# Patient Record
Sex: FEMALE | Race: BLACK OR AFRICAN AMERICAN | NOT HISPANIC OR LATINO | Employment: UNEMPLOYED | URBAN - METROPOLITAN AREA
[De-identification: names, ages, dates, MRNs, and addresses within clinical notes are randomized per-mention and may not be internally consistent; named-entity substitution may affect disease eponyms.]

---

## 2020-11-25 NOTE — PATIENT DISCUSSION
(370.33) KERATOCONJUNCTIVITIS SICCA NOT SPEC AS SJOGRENS - Assesment : Examination revealed Dry Eye Syndrome - Plan : Monitor for changes. Advised patient to call our office with decreased vision or increased symptoms. Use artificial tears as needed for relief. GLRx given today. RTC 1 year for Exam/OCT ONH, sooner if problems or changes.

## 2020-11-25 NOTE — PATIENT DISCUSSION
(365.01) OPEN ANGLE GLAUCOMA W/BORDERLINE FINDINGS-LOW RISK - Assesment : Examination revealed suspicion for Open Angle Glaucoma. OCT ONH performed today. - Plan : Monitor for changes. Advised patient to call our office when decreased vision or increased eye pain. RTC 1 year for Exam/OCT ONH, sooner if problems or changes occur.

## 2020-11-25 NOTE — PATIENT DISCUSSION
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 - Plan : Monitor for changes. Advised patient to call our office with decreased vision or increased symptoms.

## 2021-02-08 ENCOUNTER — PREPPED CHART (OUTPATIENT)
Dept: URBAN - METROPOLITAN AREA CLINIC 19 | Facility: CLINIC | Age: 61
End: 2021-02-08

## 2022-02-07 ENCOUNTER — COMPREHENSIVE EXAM (OUTPATIENT)
Dept: URBAN - METROPOLITAN AREA CLINIC 19 | Facility: CLINIC | Age: 62
End: 2022-02-07

## 2022-02-07 DIAGNOSIS — H52.223: ICD-10-CM

## 2022-02-07 PROCEDURE — 99211NC NO CHARGE VISIT

## 2022-02-07 ASSESSMENT — KERATOMETRY
OS_AXISANGLE_DEGREES: 87
OS_K1POWER_DIOPTERS: 43.25
OS_AXISANGLE2_DEGREES: 177
OD_AXISANGLE2_DEGREES: 177
OD_K2POWER_DIOPTERS: 44.00
OD_AXISANGLE_DEGREES: 87
OD_K1POWER_DIOPTERS: 43.25
OS_K2POWER_DIOPTERS: 44.00

## 2022-02-07 ASSESSMENT — VISUAL ACUITY
OD_PH: 20/30-2
OD_CC: 20/40
OU_CC: 20/25
OS_CC: 20/30-2

## 2022-02-07 ASSESSMENT — TONOMETRY
OS_IOP_MMHG: 20
OD_IOP_MMHG: 22

## 2024-03-13 ENCOUNTER — ESTABLISHED PATIENT (OUTPATIENT)
Dept: URBAN - METROPOLITAN AREA CLINIC 19 | Facility: CLINIC | Age: 64
End: 2024-03-13

## 2024-03-13 DIAGNOSIS — H52.223: ICD-10-CM

## 2024-03-13 DIAGNOSIS — H25.813: ICD-10-CM

## 2024-03-13 DIAGNOSIS — E11.9: ICD-10-CM

## 2024-03-13 PROCEDURE — 92014 COMPRE OPH EXAM EST PT 1/>: CPT

## 2024-03-13 PROCEDURE — 92015 DETERMINE REFRACTIVE STATE: CPT

## 2024-03-13 ASSESSMENT — KERATOMETRY
OS_K1POWER_DIOPTERS: 43.25
OS_K2POWER_DIOPTERS: 44.00
OD_K2POWER_DIOPTERS: 43.75
OD_K1POWER_DIOPTERS: 44.00
OS_AXISANGLE2_DEGREES: 97
OD_AXISANGLE_DEGREES: 162
OS_AXISANGLE_DEGREES: 7
OD_AXISANGLE2_DEGREES: 72

## 2024-03-13 ASSESSMENT — VISUAL ACUITY
OS_CC: 20/30-1
OU_CC: 20/30-1
OD_CC: 20/25+1

## 2024-03-13 ASSESSMENT — TONOMETRY
OS_IOP_MMHG: 20
OD_IOP_MMHG: 20

## 2025-04-09 ENCOUNTER — COMPREHENSIVE EXAM (OUTPATIENT)
Age: 65
End: 2025-04-09

## 2025-04-09 DIAGNOSIS — H25.813: ICD-10-CM

## 2025-04-09 DIAGNOSIS — E11.9: ICD-10-CM

## 2025-04-09 DIAGNOSIS — H52.223: ICD-10-CM

## 2025-04-09 PROCEDURE — 92015 DETERMINE REFRACTIVE STATE: CPT

## 2025-04-09 PROCEDURE — 92014 COMPRE OPH EXAM EST PT 1/>: CPT
